# Patient Record
Sex: FEMALE
[De-identification: names, ages, dates, MRNs, and addresses within clinical notes are randomized per-mention and may not be internally consistent; named-entity substitution may affect disease eponyms.]

---

## 2022-06-16 PROBLEM — Z00.00 ENCOUNTER FOR PREVENTIVE HEALTH EXAMINATION: Status: ACTIVE | Noted: 2022-06-16

## 2022-06-20 ENCOUNTER — APPOINTMENT (OUTPATIENT)
Dept: UROLOGY | Facility: CLINIC | Age: 67
End: 2022-06-20
Payer: MEDICARE

## 2022-06-20 VITALS
HEIGHT: 59 IN | DIASTOLIC BLOOD PRESSURE: 88 MMHG | SYSTOLIC BLOOD PRESSURE: 141 MMHG | BODY MASS INDEX: 29.94 KG/M2 | WEIGHT: 148.5 LBS

## 2022-06-20 DIAGNOSIS — R31.29 OTHER MICROSCOPIC HEMATURIA: ICD-10-CM

## 2022-06-20 DIAGNOSIS — N39.41 URGE INCONTINENCE: ICD-10-CM

## 2022-06-20 PROCEDURE — 81003 URINALYSIS AUTO W/O SCOPE: CPT | Mod: QW

## 2022-06-20 PROCEDURE — 99204 OFFICE O/P NEW MOD 45 MIN: CPT

## 2022-07-06 LAB
APPEARANCE: ABNORMAL
BACTERIA: NEGATIVE
BILIRUB UR QL STRIP: NEGATIVE
BILIRUBIN URINE: NEGATIVE
BLOOD URINE: ABNORMAL
COLLECTION METHOD: NORMAL
COLOR: YELLOW
GLUCOSE QUALITATIVE U: NEGATIVE
GLUCOSE UR-MCNC: NEGATIVE
HCG UR QL: 0.2 EU/DL
HGB UR QL STRIP.AUTO: NORMAL
HYALINE CASTS: 1 /LPF
KETONES UR-MCNC: NEGATIVE
KETONES URINE: NEGATIVE
LEUKOCYTE ESTERASE UR QL STRIP: NORMAL
LEUKOCYTE ESTERASE URINE: NEGATIVE
MICROSCOPIC-UA: NORMAL
NITRITE UR QL STRIP: NEGATIVE
NITRITE URINE: NEGATIVE
PH UR STRIP: 7.5
PH URINE: 7.5
PROT UR STRIP-MCNC: NEGATIVE
PROTEIN URINE: NORMAL
RED BLOOD CELLS URINE: 23 /HPF
SP GR UR STRIP: 1.02
SPECIFIC GRAVITY URINE: 1.02
SQUAMOUS EPITHELIAL CELLS: 4 /HPF
UROBILINOGEN URINE: NORMAL
WHITE BLOOD CELLS URINE: 2 /HPF

## 2022-07-12 NOTE — ADDENDUM
[FreeTextEntry1] : 6/20/2022: UA mod blood, neg NIT, LE, 23 RBC, 4 epi, 2 WBC\par \par Called pt and told her she had microhematuria. Recommended workup of CT and cysto and BMP given her age. She agreed. Will arrange.\par \par 7/1/2022: sCre 0.66\par \par 7/6/2022: CTU\par Liver, GB, pancreas, spleen, adrenal normal.\par UReters normal. Bladder normal. Bowel eval is limited. Kidneys are seen with symmetric nephrograms and excretion without hydro. No stones. 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 Few additional sub-cm TSTC well circumscribed non-enhancing low attenuation lesions in both kidneys c/w cysts. No filling defects. Ureters and bladder are normal.

## 2022-07-12 NOTE — HISTORY OF PRESENT ILLNESS
[FreeTextEntry1] : Language: English\par Date of First visit: 2022\par Accompanied by: self\par Contact info: [phone]\par Referring Provider/PCP: Spenser\par \par \par \par CC/ Problem List:\par \par ===============================================================================\par FIRST VISIT:\par The patient is a 67 year female who first presents 2022 for incontinence.\par \par She has symptoms of urge incontinence if she "does not get to the bathroom on time". She denies symptoms of TREVOR. she denies straining to void but sometimes she will double void a bit. She denies hematuria and dysuria. She denies constipation. She denies  trauma or surgery. She denies sensation of prolapse. She has nocturia 0-1x/night. \par \par She drinks 3 cups of coffee in the AM. She drinks occasional tea, soda rarely. She denies spicy and acidic foods. She is  all . She tried Vesicare and it helped a little bit but she had dry mouth. It did not cause constipation. \par \par \par -------------------------------------------------------------------------------------------\par INTERVAL VISITS:\par \par \par ===============================================================================\par \par PMH: Denies\par PSH: Wrist\par POBH: (if applicable) \par FH:  Denies\par \par ALL: NKDA\par MEDS: Align, Pepcid, melatonin sometimes\par SOC: Denies Tob, Social EtOH, denies drugs\par \par \par ROS: Review of Systems is as per HPI unless otherwise denoted below\par \par \par ===============================================================================\par DATA: \par \par LABS:-------------------------------------------------------------------------------------------------------------------\par 2022: UA dip mod blood, small LE, neg NIT\par \par \par RADS:-------------------------------------------------------------------------------------------------------------------\par \par \par \par PATHOLOGY/CYTOLOGY:-------------------------------------------------------------------------------------------\par \par \par \par VOIDING STUDIES: ----------------------------------------------------------------------------------------------------\par 2022: PVR 0cc\par \par \par STONE STUDIES: (Analysis/LLSA)----------------------------------------------------------------------------------\par \par \par \par PROCEDURES: -----------------------------------------------------------------------------------------------\par \par \par \par \par ===============================================================================\par \par PHYSICAL EXAM:\par \par GEN: AAOx3, NAD, Habitus: BMI 29\par \par BARRIERS to CARE: none\par \par PSYCH: Appropriate Behavior, Affect Congruent\par \par HEENT: AT/NC Trachea midline. EOMI.\par \par Lungs: No labored breathing.\par \par NEURO: + Movement, all 4 extremities grossly intact without deficits. No tremors.\par \par SKIN: Warm dry. No visible rashes or ulcers\par \par GAIT: Gait antalgic, Stability good\par \par \par =======================================================================================\par \par \par \par ASSESSMENT and PLAN\par \par The patient is a 67 year female with a history of the following:\par \par 1. Urinary incontinence\par \par Based on the patient's history and physical exam it sounds like she has UUI.\par \par --UUI: the patient and I discussed the causes of UUI which are similar to the causes of urinary frequency. These include pelvic floor dysfunction, UTI, fibroids, pelvic masses, ovarian cysts, detrusor overactivity, constipation, neurologic disorders, bladder outlet obstruction, dysfunctional voiding, and endometriosis. \par It can also be caused by non urologic causes such as DM, Diabetes insipidus, CHF, DM, neurologic disorders, stroke, MS, and diet. The patient and I discussed dietary causes of urinary frequency: I recommended she reduce caffeine, alcohol, spicy and acidic foods in her diet. \par She understands that the treatment of this condition is often multimodal. She is interested in pursuing treatment. Vesicare gave her dry mouth\par \par Based on the above, I have ordered the patient for:\par \par    Trospium ER 60mg by mouth in the morning on an empty stomach\par \par The patient understands that this medication may cause constipation and dry mouth. The patient does not have closed angle glaucoma. The patient will take measures to prevent constipation. Of course, excessive drinking to relieve dry mouth can increase frequency. Caffeine, alcohol, spicy and acidic foods can exacerbate symptoms of frequency, urgency, UI and bladder spasms.\par \par With the exception of Sanctura (trospium), all of these anticholinergic medications can cause increased confusion and dementia and may precipitate de natalie dementia. Because of this, patients with underlying confusion, dementia or those over the age of 65 years are usually safer being treated with trospium.\par \par The patient will stop taking this medication if she has difficulty voiding or if any adverse reactions are too bothersome or if she has difficulty voiding or can not void. If she  can't void, she will go to the ER or let us know right away.\par \par \par 2. r/o microhematuria\par will send UA micro\par she knows that if she has microhematuria, I will recommend a CT scan and cystoscopy\par \par \par -----------------------------------------------------------------------------------------------------\par LABS/TESTS Ordered: UA micro\par Meds Ordered: Sanctura 60mg po QD on empty stomach\par Follow up: one month for PVR and sx check\par -----------------------------------------------------------------------------------------------------\par \par Greater than 50% of this 45 minute visit was spent counseling the patient and coordinating care.\par \par Thank you for allowing me to assist in the care of your patient. Should you have any questions please do not hesitate to reach out to me.\par \par \par Britta Frank MD\par Associate \par Department of Urology\par [facility]\par Phone: [phone]\par Fax: [phone]\par \par [address]\par [facility] [address]\par

## 2022-07-15 ENCOUNTER — NON-APPOINTMENT (OUTPATIENT)
Age: 67
End: 2022-07-15

## 2022-07-18 ENCOUNTER — APPOINTMENT (OUTPATIENT)
Dept: UROLOGY | Facility: CLINIC | Age: 67
End: 2022-07-18

## 2022-07-19 ENCOUNTER — APPOINTMENT (OUTPATIENT)
Dept: UROLOGY | Facility: CLINIC | Age: 67
End: 2022-07-19

## 2022-08-01 ENCOUNTER — APPOINTMENT (OUTPATIENT)
Dept: UROLOGY | Facility: CLINIC | Age: 67
End: 2022-08-01

## 2022-08-01 DIAGNOSIS — D17.9 BENIGN LIPOMATOUS NEOPLASM, UNSPECIFIED: ICD-10-CM

## 2022-08-01 LAB
BILIRUB UR QL STRIP: NEGATIVE
GLUCOSE UR-MCNC: NEGATIVE
HCG UR QL: 0.2 EU/DL
HGB UR QL STRIP.AUTO: NORMAL
KETONES UR-MCNC: NEGATIVE
LEUKOCYTE ESTERASE UR QL STRIP: NEGATIVE
NITRITE UR QL STRIP: NEGATIVE
PH UR STRIP: 7
PROT UR STRIP-MCNC: NEGATIVE
SP GR UR STRIP: 1.01

## 2022-08-01 PROCEDURE — 99213 OFFICE O/P EST LOW 20 MIN: CPT | Mod: 25

## 2022-08-01 PROCEDURE — 52000 CYSTOURETHROSCOPY: CPT

## 2022-08-01 PROCEDURE — 81003 URINALYSIS AUTO W/O SCOPE: CPT | Mod: QW

## 2023-02-02 ENCOUNTER — APPOINTMENT (OUTPATIENT)
Dept: UROLOGY | Facility: CLINIC | Age: 68
End: 2023-02-02
Payer: MEDICARE

## 2023-02-02 PROCEDURE — 99442: CPT

## 2023-09-12 ENCOUNTER — APPOINTMENT (OUTPATIENT)
Dept: UROLOGY | Facility: CLINIC | Age: 68
End: 2023-09-12

## 2024-01-23 RX ORDER — TROSPIUM CHLORIDE 60 MG/1
60 CAPSULE, EXTENDED RELEASE ORAL
Qty: 30 | Refills: 11 | Status: DISCONTINUED | COMMUNITY
Start: 2022-06-20 | End: 2024-01-23

## 2024-02-15 ENCOUNTER — APPOINTMENT (OUTPATIENT)
Dept: UROLOGY | Facility: CLINIC | Age: 69
End: 2024-02-15

## 2024-02-27 ENCOUNTER — APPOINTMENT (OUTPATIENT)
Dept: UROLOGY | Facility: CLINIC | Age: 69
End: 2024-02-27

## 2024-03-04 RX ORDER — TROSPIUM CHLORIDE 60 MG/1
60 CAPSULE, EXTENDED RELEASE ORAL
Qty: 30 | Refills: 0 | Status: ACTIVE | COMMUNITY
Start: 2024-03-04 | End: 1900-01-01

## 2024-04-02 ENCOUNTER — APPOINTMENT (OUTPATIENT)
Dept: UROLOGY | Facility: CLINIC | Age: 69
End: 2024-04-02

## 2024-04-17 ENCOUNTER — APPOINTMENT (OUTPATIENT)
Dept: UROLOGY | Facility: CLINIC | Age: 69
End: 2024-04-17

## 2024-08-08 ENCOUNTER — APPOINTMENT (OUTPATIENT)
Dept: UROLOGY | Facility: CLINIC | Age: 69
End: 2024-08-08

## 2024-08-08 PROCEDURE — 99213 OFFICE O/P EST LOW 20 MIN: CPT

## 2024-08-09 ENCOUNTER — TRANSCRIPTION ENCOUNTER (OUTPATIENT)
Age: 69
End: 2024-08-09

## 2024-08-12 NOTE — HISTORY OF PRESENT ILLNESS
[FreeTextEntry1] : Language: English Date of First visit: 2022 Accompanied by: self Contact info: [phone] Referring Provider/PCP: Spenser    CC/ Problem List:  =============================================================================== FIRST VISIT: The patient was a 67 year female who first presented on 2022 for incontinence.  She has symptoms of urge incontinence if she "does not get to the bathroom on time". She denies symptoms of TREVOR. she denies straining to void but sometimes she will double void a bit. She denies hematuria and dysuria. She denies constipation. She denies  trauma or surgery. She denies sensation of prolapse. She has nocturia 0-1x/night.   She drinks 3 cups of coffee in the AM. She drinks occasional tea, soda rarely. She denies spicy and acidic foods. She is  all . She tried Vesicare and it helped a little bit but she had dry mouth. It did not cause constipation.    ------------------------------------------------------------------------------------------- INTERVAL VISITS: Her UA showed microhematuria, so we initiated a hematuria workup. She started the Sanctura/Trospium and feels that it helps her urgency and UI. She still has some urgency but states she has improved UI. her CTU showed renal cysts and a 1cm AML. We did a cysto on 2022 that was normal.  The patient's age today 2024  is 69 year old. Please note interval events and changes in PMH, PSH, MEDS and ALLERGIES were reviewed. She has not been seen since 2023. She does not take the pills regularly. She has been getting refills from her PCP. She still leaks even with the pills. She denies Sx's of TREVOR. She does have frequency. She denies hematuria/dysuria.  She does not think the pills work great and would be interested in other options.  ===============================================================================  PMH: HLD, HTN PSH: Wrist POBH: (if applicable)  FH:  Denies  ALL: NKDA MEDS: Align, Pepcid, melatonin sometimes, Trospium 60mg SOC: Denies Tob, Social EtOH, denies drugs   ROS: Review of Systems is as per HPI unless otherwise denoted below   =============================================================================== DATA:   LABS:------------------------------------------------------------------------------------------------------------------- 2022: UA dip mod blood, small LE, neg NIT 2022: UA mod blood, neg NIT, LE, 23 RBC, 4 epi, 2 WBC 2022: sCre 0.66 2022: UA dip Small blood, Neg NIT/LE   RADS:------------------------------------------------------------------------------------------------------------------- 2022: CTU Liver, GB, pancreas, spleen, adrenal normal. Ureters normal. Bladder normal. Bowel eval is limited. Kidneys are seen with symmetric nephrograms and excretion without hydro. No stones. 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 Few additional sub-cm TSTC well circumscribed non-enhancing low attenuation lesions in both kidneys c/w cysts. No filling defects. Ureters and bladder are normal.    PATHOLOGY/CYTOLOGY:-------------------------------------------------------------------------------------------    VOIDING STUDIES: ---------------------------------------------------------------------------------------------------- 2022: PVR 0cc 2022: PVR 46cc 2024: 3cc   STONE STUDIES: (Analysis/LLSA)----------------------------------------------------------------------------------    PROCEDURES: ----------------------------------------------------------------------------------------------- 2022: Cystoscopy Normal study    ===============================================================================  PHYSICAL EXAM:  GEN: AAOx3, NAD, Habitus: BMI 29  BARRIERS to CARE: none based on this phone visit   =======================================================================================    ASSESSMENT and PLAN  Today 2024 the patient is a 69 year old female with a history of the followin. Urinary incontinence She is on trospium which she does not take regularly. Even when she takes it, she still has urge incontinence. She is interested in other non-medical options such as botox or PTNS. She does not want to come to the city for this. I have given her Dr. Lexy Leigh's name for f/u.   2. r/o microhematuria her CTU showed a 1cm LLP AML and some renal cysts. It was otherwise negative. she is s/p cystoscopy 2022 that was normal. She has not been good at f/u. We will send a UA micro today.   3. 1cm LLP AML At some point she will need a renal sono though it is not urgent   ----------------------------------------------------------------------------------------------------- LABS/TESTS Ordered: UA micro Meds Ordered: cont Sanctura 60mg po QD on empty stomach Follow up:  See Dr. Leigh  -----------------------------------------------------------------------------------------------------  The total amount of time I have personally spent preparing for this visit, reviewing the patient's test results, obtaining external history, ordering tests/medications, documenting clinical information, communicating with and counseling the patient/family and/or caregiver(s), and spent face to face with the patient explaining the above was 25 minutes.  Thank you for allowing me to assist in the care of your patient. Should you have any questions please do not hesitate to reach out to me.   Britta Frank MD Associate  Department of Urology [facility] Phone: [phone] Fax: [phone] [address] [address]

## 2024-08-12 NOTE — HISTORY OF PRESENT ILLNESS
[FreeTextEntry1] : Language: English Date of First visit: 2022 Accompanied by: self Contact info: [phone] Referring Provider/PCP: Spenser    CC/ Problem List:  =============================================================================== FIRST VISIT: The patient was a 67 year female who first presented on 2022 for incontinence.  She has symptoms of urge incontinence if she "does not get to the bathroom on time". She denies symptoms of TREVOR. she denies straining to void but sometimes she will double void a bit. She denies hematuria and dysuria. She denies constipation. She denies  trauma or surgery. She denies sensation of prolapse. She has nocturia 0-1x/night.   She drinks 3 cups of coffee in the AM. She drinks occasional tea, soda rarely. She denies spicy and acidic foods. She is  all . She tried Vesicare and it helped a little bit but she had dry mouth. It did not cause constipation.    ------------------------------------------------------------------------------------------- INTERVAL VISITS: Her UA showed microhematuria, so we initiated a hematuria workup. She started the Sanctura/Trospium and feels that it helps her urgency and UI. She still has some urgency but states she has improved UI. her CTU showed renal cysts and a 1cm AML. We did a cysto on 2022 that was normal.  The patient's age today 2024  is 69 year old. Please note interval events and changes in PMH, PSH, MEDS and ALLERGIES were reviewed. She has not been seen since 2023. She does not take the pills regularly. She has been getting refills from her PCP. She still leaks even with the pills. She denies Sx's of TREVOR. She does have frequency. She denies hematuria/dysuria.  She does not think the pills work great and would be interested in other options.  ===============================================================================  PMH: HLD, HTN PSH: Wrist POBH: (if applicable)  FH:  Denies  ALL: NKDA MEDS: Align, Pepcid, melatonin sometimes, Trospium 60mg SOC: Denies Tob, Social EtOH, denies drugs   ROS: Review of Systems is as per HPI unless otherwise denoted below   =============================================================================== DATA:   LABS:------------------------------------------------------------------------------------------------------------------- 2022: UA dip mod blood, small LE, neg NIT 2022: UA mod blood, neg NIT, LE, 23 RBC, 4 epi, 2 WBC 2022: sCre 0.66 2022: UA dip Small blood, Neg NIT/LE   RADS:------------------------------------------------------------------------------------------------------------------- 2022: CTU Liver, GB, pancreas, spleen, adrenal normal. Ureters normal. Bladder normal. Bowel eval is limited. Kidneys are seen with symmetric nephrograms and excretion without hydro. No stones. 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 Few additional sub-cm TSTC well circumscribed non-enhancing low attenuation lesions in both kidneys c/w cysts. No filling defects. Ureters and bladder are normal.    PATHOLOGY/CYTOLOGY:-------------------------------------------------------------------------------------------    VOIDING STUDIES: ---------------------------------------------------------------------------------------------------- 2022: PVR 0cc 2022: PVR 46cc 2024: 3cc   STONE STUDIES: (Analysis/LLSA)----------------------------------------------------------------------------------    PROCEDURES: ----------------------------------------------------------------------------------------------- 2022: Cystoscopy Normal study    ===============================================================================  PHYSICAL EXAM:  GEN: AAOx3, NAD, Habitus: BMI 29  BARRIERS to CARE: none based on this phone visit   =======================================================================================    ASSESSMENT and PLAN  Today 2024 the patient is a 69 year old female with a history of the followin. Urinary incontinence She is on trospium which she does not take regularly. Even when she takes it, she still has urge incontinence. She is interested in other non-medical options such as botox or PTNS. She does not want to come to the city for this. I have given her Dr. Lexy Leigh's name for f/u.   2. r/o microhematuria her CTU showed a 1cm LLP AML and some renal cysts. It was otherwise negative. she is s/p cystoscopy 2022 that was normal. She has not been good at f/u. We will send a UA micro today.   3. 1cm LLP AML At some point she will need a renal sono though it is not urgent   ----------------------------------------------------------------------------------------------------- LABS/TESTS Ordered: UA micro Meds Ordered: cont Sanctura 60mg po QD on empty stomach Follow up:  See Dr. Leigh  -----------------------------------------------------------------------------------------------------  The total amount of time I have personally spent preparing for this visit, reviewing the patient's test results, obtaining external history, ordering tests/medications, documenting clinical information, communicating with and counseling the patient/family and/or caregiver(s), and spent face to face with the patient explaining the above was 25 minutes.  Thank you for allowing me to assist in the care of your patient. Should you have any questions please do not hesitate to reach out to me.   Britta Frank MD Associate  Department of Urology [facility] Phone: [phone] Fax: [phone] [address] [address]

## 2024-08-22 ENCOUNTER — TRANSCRIPTION ENCOUNTER (OUTPATIENT)
Age: 69
End: 2024-08-22